# Patient Record
Sex: FEMALE | Race: WHITE | Employment: OTHER | URBAN - METROPOLITAN AREA
[De-identification: names, ages, dates, MRNs, and addresses within clinical notes are randomized per-mention and may not be internally consistent; named-entity substitution may affect disease eponyms.]

---

## 2017-11-23 PROBLEM — N12 PYELONEPHRITIS: Status: ACTIVE | Noted: 2017-11-23

## 2018-04-11 ENCOUNTER — APPOINTMENT (OUTPATIENT)
Dept: GENERAL RADIOLOGY | Age: 68
End: 2018-04-11
Attending: EMERGENCY MEDICINE
Payer: MEDICARE

## 2018-04-11 ENCOUNTER — HOSPITAL ENCOUNTER (EMERGENCY)
Age: 68
Discharge: HOME | End: 2018-04-11
Attending: EMERGENCY MEDICINE
Payer: MEDICARE

## 2018-04-11 VITALS
DIASTOLIC BLOOD PRESSURE: 62 MMHG | OXYGEN SATURATION: 100 % | HEART RATE: 83 BPM | TEMPERATURE: 98.4 F | RESPIRATION RATE: 15 BRPM | SYSTOLIC BLOOD PRESSURE: 144 MMHG

## 2018-04-11 DIAGNOSIS — R07.89 CHEST WALL PAIN: Primary | ICD-10-CM
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
## 2018-04-11 LAB
ATRIAL RATE: 89 BPM
CALCULATED P AXIS, ECG09: -9 DEGREES
CALCULATED R AXIS, ECG10: 52 DEGREES
CALCULATED T AXIS, ECG11: 49 DEGREES
DIAGNOSIS, 93000: NORMAL
P-R INTERVAL, ECG05: 126 MS
Q-T INTERVAL, ECG07: 350 MS
QRS DURATION, ECG06: 72 MS
QTC CALCULATION (BEZET), ECG08: 425 MS
VENTRICULAR RATE, ECG03: 89 BPM

## 2018-04-11 PROCEDURE — 71046 X-RAY EXAM CHEST 2 VIEWS: CPT

## 2018-04-11 PROCEDURE — 93005 ELECTROCARDIOGRAM TRACING: CPT

## 2018-04-11 PROCEDURE — 99285 EMERGENCY DEPT VISIT HI MDM: CPT
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
## 2018-12-11 PROBLEM — M48.00 SPINAL STENOSIS: Status: ACTIVE | Noted: 2018-12-11

## 2018-12-25 PROBLEM — A41.9 SEPTIC SHOCK (HCC): Status: ACTIVE | Noted: 2018-12-25

## 2018-12-25 PROBLEM — R50.9 FEVER: Status: ACTIVE | Noted: 2018-12-25

## 2018-12-25 PROBLEM — R65.21 SEPTIC SHOCK (HCC): Status: ACTIVE | Noted: 2018-12-25

## 2018-12-25 PROBLEM — N17.9 ACUTE KIDNEY INJURY (HCC): Status: ACTIVE | Noted: 2018-12-25

## 2018-12-25 PROBLEM — R00.0 TACHYCARDIA: Status: ACTIVE | Noted: 2018-12-25

## 2019-02-08 PROBLEM — R65.10 SYSTEMIC INFLAMMATORY RESPONSE SYNDROME (HCC): Status: ACTIVE | Noted: 2019-02-08

## 2019-02-18 PROBLEM — L03.119 CELLULITIS AND ABSCESS OF FOOT, EXCEPT TOES: Status: ACTIVE | Noted: 2017-02-26

## 2019-02-18 PROBLEM — G25.3 MYOCLONUS: Status: ACTIVE | Noted: 2018-10-08

## 2019-02-18 PROBLEM — R68.89 DECREASED ACTIVITY TOLERANCE: Status: ACTIVE | Noted: 2017-01-01

## 2019-02-18 PROBLEM — L02.619 CELLULITIS AND ABSCESS OF FOOT, EXCEPT TOES: Status: ACTIVE | Noted: 2017-02-26

## 2019-02-18 PROBLEM — E87.1 HYPONATREMIA: Status: ACTIVE | Noted: 2017-02-26

## 2019-02-18 PROBLEM — E11.621 DIABETIC ULCER OF RIGHT FOOT ASSOCIATED WITH TYPE 2 DIABETES MELLITUS (HCC): Status: ACTIVE | Noted: 2017-01-27

## 2019-02-18 PROBLEM — I67.9 CEREBROVASCULAR DISEASE: Status: ACTIVE | Noted: 2018-10-08

## 2019-02-18 PROBLEM — L97.519 DIABETIC ULCER OF RIGHT FOOT ASSOCIATED WITH TYPE 2 DIABETES MELLITUS (HCC): Status: ACTIVE | Noted: 2017-01-27

## 2019-02-18 PROBLEM — S92.009A CALCANEAL FRACTURE: Status: ACTIVE | Noted: 2017-08-15

## 2019-02-18 PROBLEM — G31.84 MINIMAL COGNITIVE IMPAIRMENT: Status: ACTIVE | Noted: 2018-10-08

## 2019-02-23 PROBLEM — A41.9 SEPSIS (HCC): Status: ACTIVE | Noted: 2019-02-23

## 2019-02-23 PROBLEM — N39.0 URINARY TRACT INFECTION: Status: ACTIVE | Noted: 2019-02-23

## 2019-10-12 PROBLEM — R06.03 RESPIRATORY DISTRESS: Status: ACTIVE | Noted: 2019-10-12

## 2019-10-12 PROBLEM — I50.9 CONGESTIVE HEART FAILURE (CHF) (HCC): Status: ACTIVE | Noted: 2019-10-12

## 2020-03-08 PROBLEM — R10.9 ABDOMINAL PAIN: Status: ACTIVE | Noted: 2020-03-08

## 2020-03-08 PROBLEM — N17.9 ACUTE-ON-CHRONIC KIDNEY INJURY (HCC): Status: ACTIVE | Noted: 2020-03-08

## 2020-03-08 PROBLEM — R19.7 DIARRHEA: Status: ACTIVE | Noted: 2020-03-08

## 2020-03-08 PROBLEM — N18.9 ACUTE-ON-CHRONIC KIDNEY INJURY (HCC): Status: ACTIVE | Noted: 2020-03-08

## 2020-03-08 PROBLEM — R11.2 NAUSEA & VOMITING: Status: ACTIVE | Noted: 2020-03-08

## 2020-03-10 PROBLEM — R11.2 NAUSEA AND VOMITING: Status: ACTIVE | Noted: 2020-03-10

## 2021-07-12 PROBLEM — R11.2 NAUSEA & VOMITING: Status: RESOLVED | Noted: 2020-03-08 | Resolved: 2021-01-01

## 2021-07-12 PROBLEM — N39.0 UTI (URINARY TRACT INFECTION): Status: ACTIVE | Noted: 2020-01-01

## 2021-07-12 PROBLEM — N39.0 URINARY TRACT INFECTION: Status: RESOLVED | Noted: 2019-02-23 | Resolved: 2021-01-01
